# Patient Record
Sex: MALE | Race: OTHER | HISPANIC OR LATINO | URBAN - METROPOLITAN AREA
[De-identification: names, ages, dates, MRNs, and addresses within clinical notes are randomized per-mention and may not be internally consistent; named-entity substitution may affect disease eponyms.]

---
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
## 2023-03-30 NOTE — ED BEHAVIORAL HEALTH ASSESSMENT NOTE - ACCOMPANIED BY
Peds sleep scheduling spoke with mom and rescheduled the sleep study to tonight, 3/30 at 6:30pm in [place].    Per Dr. Perez- please move up the virtual follow up appointment to tomorrow, 3/31 at 12pm.     Scheduled left a voice message for mom informing her of the follow up appointment change, sent an e-mail, as well as a live well message.    Mom e-mailed back and confirmed follow up appointment for 3/31 at 12pm.      Thank you,   [facility]  [phone]   Family member